# Patient Record
Sex: FEMALE | Race: WHITE
[De-identification: names, ages, dates, MRNs, and addresses within clinical notes are randomized per-mention and may not be internally consistent; named-entity substitution may affect disease eponyms.]

---

## 2017-04-07 ENCOUNTER — HOSPITAL ENCOUNTER (EMERGENCY)
Dept: HOSPITAL 62 - ER | Age: 42
Discharge: HOME | End: 2017-04-07
Payer: COMMERCIAL

## 2017-04-07 VITALS — SYSTOLIC BLOOD PRESSURE: 149 MMHG | DIASTOLIC BLOOD PRESSURE: 82 MMHG

## 2017-04-07 DIAGNOSIS — R10.9: Primary | ICD-10-CM

## 2017-04-07 DIAGNOSIS — K21.9: ICD-10-CM

## 2017-04-07 DIAGNOSIS — I10: ICD-10-CM

## 2017-04-07 DIAGNOSIS — E11.9: ICD-10-CM

## 2017-04-07 DIAGNOSIS — E66.01: ICD-10-CM

## 2017-04-07 LAB
ALBUMIN SERPL-MCNC: 4.2 G/DL (ref 3.5–5)
ALP SERPL-CCNC: 123 U/L (ref 38–126)
ALT SERPL-CCNC: 94 U/L (ref 9–52)
ANION GAP SERPL CALC-SCNC: 12 MMOL/L (ref 5–19)
APPEARANCE UR: (no result)
AST SERPL-CCNC: 81 U/L (ref 14–36)
BASOPHILS # BLD AUTO: 0.1 10^3/UL (ref 0–0.2)
BASOPHILS NFR BLD AUTO: 1.2 % (ref 0–2)
BILIRUB DIRECT SERPL-MCNC: 0.3 MG/DL (ref 0–0.4)
BILIRUB SERPL-MCNC: 0.8 MG/DL (ref 0.2–1.3)
BILIRUB UR QL STRIP: NEGATIVE
BUN SERPL-MCNC: 13 MG/DL (ref 7–20)
CALCIUM: 9.8 MG/DL (ref 8.4–10.2)
CHLORIDE SERPL-SCNC: 102 MMOL/L (ref 98–107)
CO2 SERPL-SCNC: 28 MMOL/L (ref 22–30)
CREAT SERPL-MCNC: 0.62 MG/DL (ref 0.52–1.25)
EOSINOPHIL # BLD AUTO: 0.1 10^3/UL (ref 0–0.6)
EOSINOPHIL NFR BLD AUTO: 0.7 % (ref 0–6)
ERYTHROCYTE [DISTWIDTH] IN BLOOD BY AUTOMATED COUNT: 13.8 % (ref 11.5–14)
GLUCOSE SERPL-MCNC: 194 MG/DL (ref 75–110)
GLUCOSE UR STRIP-MCNC: NEGATIVE MG/DL
HCT VFR BLD CALC: 42.9 % (ref 36–47)
HGB BLD-MCNC: 14.3 G/DL (ref 12–15.5)
HGB HCT DIFFERENCE: 0
KETONES UR STRIP-MCNC: NEGATIVE MG/DL
LIPASE SERPL-CCNC: 458.1 U/L (ref 23–300)
LYMPHOCYTES # BLD AUTO: 1.4 10^3/UL (ref 0.5–4.7)
LYMPHOCYTES NFR BLD AUTO: 13.2 % (ref 13–45)
MCH RBC QN AUTO: 28.8 PG (ref 27–33.4)
MCHC RBC AUTO-ENTMCNC: 33.3 G/DL (ref 32–36)
MCV RBC AUTO: 86 FL (ref 80–97)
MONOCYTES # BLD AUTO: 0.5 10^3/UL (ref 0.1–1.4)
MONOCYTES NFR BLD AUTO: 4.9 % (ref 3–13)
NEUTROPHILS # BLD AUTO: 8.5 10^3/UL (ref 1.7–8.2)
NEUTS SEG NFR BLD AUTO: 80 % (ref 42–78)
NITRITE UR QL STRIP: NEGATIVE
PH UR STRIP: 5 [PH] (ref 5–9)
POTASSIUM SERPL-SCNC: 4.2 MMOL/L (ref 3.6–5)
PROT SERPL-MCNC: 7.8 G/DL (ref 6.3–8.2)
PROT UR STRIP-MCNC: 30 MG/DL
RBC # BLD AUTO: 4.97 10^6/UL (ref 3.72–5.28)
SODIUM SERPL-SCNC: 142.3 MMOL/L (ref 137–145)
SP GR UR STRIP: 1.03
UROBILINOGEN UR-MCNC: NEGATIVE MG/DL (ref ?–2)
WBC # BLD AUTO: 10.7 10^3/UL (ref 4–10.5)

## 2017-04-07 PROCEDURE — 80053 COMPREHEN METABOLIC PANEL: CPT

## 2017-04-07 PROCEDURE — 96375 TX/PRO/DX INJ NEW DRUG ADDON: CPT

## 2017-04-07 PROCEDURE — 36415 COLL VENOUS BLD VENIPUNCTURE: CPT

## 2017-04-07 PROCEDURE — 85025 COMPLETE CBC W/AUTO DIFF WBC: CPT

## 2017-04-07 PROCEDURE — 99284 EMERGENCY DEPT VISIT MOD MDM: CPT

## 2017-04-07 PROCEDURE — 74176 CT ABD & PELVIS W/O CONTRAST: CPT

## 2017-04-07 PROCEDURE — 81001 URINALYSIS AUTO W/SCOPE: CPT

## 2017-04-07 PROCEDURE — 96374 THER/PROPH/DIAG INJ IV PUSH: CPT

## 2017-04-07 PROCEDURE — 83690 ASSAY OF LIPASE: CPT

## 2017-04-07 PROCEDURE — 76830 TRANSVAGINAL US NON-OB: CPT

## 2017-04-07 PROCEDURE — 93976 VASCULAR STUDY: CPT

## 2017-04-07 PROCEDURE — 84703 CHORIONIC GONADOTROPIN ASSAY: CPT

## 2017-04-07 NOTE — ER DOCUMENT REPORT
ED General





- General


Chief Complaint: Abdominal Pain


Stated Complaint: ABDOMINAL PAIN


Notes: 


Patient is a 41-year-old female with past medical history of morbid obesity and 

hypertension who presents with concerns of acute onset of right adnexal pain.  

States that she was treated for urinary tract infection several days ago and 

had resolution of her dysuria and suprapubic pain.  However, states today she 

had an acute onset of focal right adnexal pain that she describes as severe, 

stabbing and constant.  Nothing improves or worsens the pain.  She denies any 

history of similar symptoms in the past.  No prior history of nephrolithiasis, 

ovarian torsion.  Her last menstrual period was the beginning of this month.  

She notes associated nausea without vomiting.  No diarrhea, chest pain or 

shortness of breath.  She has no prior abdominal surgeries.  She denies any 

ongoing dysuria.  She has not seen her primary care doctor regarding today's 

concerns.


TRAVEL OUTSIDE OF THE [place] IN LAST 30 DAYS: No





- Related Data


Allergies/Adverse Reactions: 


 





No Known Allergies Allergy (Verified 04/07/17 16:24)


 











Past Medical History





- General


Information source: Patient





- Social History


Smoking Status: Never Smoker


Chew tobacco use (# tins/day): No


Frequency of alcohol use: None


Drug Abuse: None


Lives with: Spouse/Significant other


Family History: Hypertension


Patient has suicidal ideation: No


Patient has homicidal ideation: No





- Past Medical History


Cardiac Medical History: Reports: Hx Hypertension


Endocrine Medical History: Reports: Hx Diabetes Mellitus Type 2


Renal/ Medical History: Denies: Hx Ovarian Cysts, Hx Peritoneal Dialysis


GI Medical History: Reports: Hx Gastroesophageal Reflux Disease


Past Surgical History: Reports: Hx Tonsillectomy





- Immunizations


Immunizations up to date: Yes


Hx Diphtheria, Pertussis, Tetanus Vaccination: Yes


Hx Pneumococcal Vaccination: 01/01/10





Review of Systems





- Review of Systems


Notes: 


Constitutional: Negative for fever.


HENT: Negative for sore throat.


Eyes: Negative for visual changes.


Cardiovascular: Negative for chest pain.


Respiratory: Negative for shortness of breath.


Gastrointestinal: Positive for abdominal pain and nausea


Genitourinary: Negative for dysuria.


Musculoskeletal: Negative for back pain.


Skin: Negative for rash.


Neurological: Negative for headaches, weakness or numbness.





10 point ROS negative except as marked above and in HPI.





Physical Exam





- Vital signs


Vitals: 


 











Temp Pulse Resp BP Pulse Ox


 


 97.8 F   98   20   161/89 H  95 


 


 04/07/17 16:25  04/07/17 16:25  04/07/17 16:25  04/07/17 16:25  04/07/17 16:25











Interpretation: Hypertensive


Notes: 


PHYSICAL EXAMINATION:





GENERAL: Appears uncomfortable and in pain.  Morbidly obese.





HEAD: Atraumatic, normocephalic.





EYES: Pupils equal round and reactive to light, extraocular movements intact, 

sclera anicteric, conjunctiva are normal.





ENT: nares patent, oropharynx clear without exudates.  Moist mucous membranes.





NECK: Normal range of motion, supple without lymphadenopathy





LUNGS: Breath sounds clear to auscultation bilaterally and equal.  No wheezes 

rales or rhonchi.





HEART: Regular rate and rhythm without murmurs





ABDOMEN: Morbidly obese abdomen.  Soft, focal right adnexal tenderness, 

normoactive bowel sounds.  No guarding, no rebound.  No masses appreciated.





EXTREMITIES: Normal range of motion, no pitting or edema.  No cyanosis.





NEUROLOGICAL: No focal neurological deficits. Moves all extremities 

spontaneously and on command.





PSYCH: Normal mood, normal affect.





SKIN: Warm, Dry, normal turgor, no rashes or lesions noted.





Course





- Re-evaluation


Re-evalutation: 


04/07/17 19:01


Patient presents with acute onset of focal right lower adnexal tenderness on 

exam.  Her abdominal exam is limited secondary to her morbid obesity with a BMI 

of 60.  However, patient does have focal adnexal tenderness at time of 

palpation without any focal right lower quadrant tenderness.  She has no right 

CVA tenderness.  Urinalysis does show hematuria but is otherwise unremarkable.  

Primary concern at this time would be ovarian torsion.  I have asked for a stat 

transvaginal ultrasound to better assess.  Ultrasound was directly called and 

asked transfer this patient immediately for this ultrasound.  If this 

demonstrates good flow to the right ovary will plan for a CT without contrast 

of the abdomen and pelvis of nephrolithiasis given her hematuria and the abrupt 

onset and severity of her pain.








04/07/17 22:04


Patient has had resolution of her abdominal pain at this time.  Transvaginal 

ultrasound demonstrates flow to the right ovary and no obvious mass although it 

is a limited study secondary to patient's morbid obesity.  A CT scan of the 

abdomen and pelvis without contrast was performed to evaluate for acute 

nephrolithiasis and was normal.  Repeat abdominal exam now at this time without 

any focal tenderness.  Again her history is not clinically consistent with 

acute appendicitis given the abrupt onset of her pain.  Urinalysis is not 

consistent with acute pyelonephritis or an acute infection.  I have instructed 

the patient to follow-up with her primary care doctor in the morning for 

recheck of her abdomen.  I have reviewed with her that at this time not certain 

of the exact cause for her abdominal pain and that she should return to the 

emergency department immediately should she have recurrence of this pain, 

vomiting, fever or any other symptoms that are worrisome to her.





- Vital Signs


Vital signs: 


 











Temp Pulse Resp BP Pulse Ox


 


 97.8 F   98   20   161/89 H  95 


 


 04/07/17 16:25  04/07/17 16:25  04/07/17 16:25  04/07/17 16:25  04/07/17 16:25














- Laboratory


Result Diagrams: 


 04/07/17 17:30





 04/07/17 17:30


Laboratory results interpreted by me: 


 











  04/07/17 04/07/17 04/07/17





  17:30 17:30 17:30


 


WBC  10.7 H  


 


Seg Neutrophils %  80.0 H  


 


Absolute Neutrophils  8.5 H  


 


Glucose   194 H 


 


AST   81 H 


 


ALT   94 H 


 


Lipase   458.1 H 


 


Urine Protein    30 H


 


Urine Blood    LARGE H


 


Ur Leukocyte Esterase    TRACE H














Discharge





- Discharge


Clinical Impression: 


 Right sided abdominal pain





Condition: Good


Disposition: HOME, SELF-CARE


Instructions:  Observation for Appendicitis ([facility])


Additional Instructions: 


You have been seen in the Emergency Department (ED) for abdominal pain.  Your 

evaluation did not identify a clear cause of your symptoms but was generally 

reassuring.





Please follow up with your doctor as soon as possible regarding today's 

emergent visit and the symptoms that are bothering you.





Return to the ED if your abdominal pain worsens or fails to improve, you 

develop bloody vomiting, bloody diarrhea, you are unable to tolerate fluids due 

to vomiting, fever greater than 101, or other symptoms that concern you.


Referrals: 


VIBHA HALEY MD [Primary Care Provider] - Follow up tomorrow

## 2017-04-07 NOTE — ER DOCUMENT REPORT
ED Medical Screen (RME)





- General


Chief Complaint: Abdominal Pain


Stated Complaint: ABDOMINAL PAIN


Notes: 


Patient says that she's been having problems with a UTI for a couple of weeks.  

She had burning and discomfort and urgency urination and went to a local urgent 

care on March 26.  She was diagnosed with UTI and given a prescription for an 

antibiotic to take twice a day for 3 days.  She finished the antibiotic and her 

symptoms improved, but today, she went to the restroom.  She noted some burning 

with urination on Wednesday and then today she was walking along, when she was 

suddenly hit with a pain in the right lower pelvic region which "dropped me to 

my knees".  She is still complaining of severe pain in that right lower pelvic 

region.  Patient is not aware of any fever.  Has not had any vomiting or 

diarrhea.





LMP March 18.  On no birth control.


PMH: Hypertension, NIDDM, no surgeries.


TRAVEL OUTSIDE OF THE [place] IN LAST 30 DAYS: No





- Related Data


Allergies/Adverse Reactions: 


 





No Known Allergies Allergy (Verified 04/07/17 16:24)


 











Past Medical History





- Past Medical History


Cardiac Medical History: Reports: Hx Hypertension


Endocrine Medical History: Denies: Hx Diabetes Mellitus Type 2


Renal/ Medical History: Denies: Hx Ovarian Cysts, Hx Peritoneal Dialysis


GI Medical History: Reports: Hx Gastroesophageal Reflux Disease


Past Surgical History: Reports: Hx Tonsillectomy





- Immunizations


Immunizations up to date: Yes


Hx Diphtheria, Pertussis, Tetanus Vaccination: Yes





Physical Exam





- Vital signs


Vitals: 





 











Temp Pulse Resp BP Pulse Ox


 


 97.8 F   98   20   161/89 H  95 


 


 04/07/17 16:25  04/07/17 16:25  04/07/17 16:25  04/07/17 16:25  04/07/17 16:25














Course





- Vital Signs


Vital signs: 





 











Temp Pulse Resp BP Pulse Ox


 


 97.8 F   98   20   161/89 H  95 


 


 04/07/17 16:25  04/07/17 16:25  04/07/17 16:25  04/07/17 16:25  04/07/17 16:25

## 2017-04-27 ENCOUNTER — HOSPITAL ENCOUNTER (OUTPATIENT)
Dept: HOSPITAL 62 - OD | Age: 42
End: 2017-04-27
Attending: INTERNAL MEDICINE
Payer: COMMERCIAL

## 2017-04-27 DIAGNOSIS — E78.00: ICD-10-CM

## 2017-04-27 DIAGNOSIS — E11.9: Primary | ICD-10-CM

## 2017-04-27 LAB
ALBUMIN SERPL-MCNC: 3.9 G/DL (ref 3.5–5)
ALP SERPL-CCNC: 101 U/L (ref 38–126)
ALT SERPL-CCNC: 53 U/L (ref 9–52)
ANION GAP SERPL CALC-SCNC: 12 MMOL/L (ref 5–19)
AST SERPL-CCNC: 47 U/L (ref 14–36)
BILIRUB DIRECT SERPL-MCNC: 0.4 MG/DL (ref 0–0.4)
BILIRUB SERPL-MCNC: 1.2 MG/DL (ref 0.2–1.3)
BUN SERPL-MCNC: 12 MG/DL (ref 7–20)
CALCIUM: 9.5 MG/DL (ref 8.4–10.2)
CHLORIDE SERPL-SCNC: 101 MMOL/L (ref 98–107)
CO2 SERPL-SCNC: 30 MMOL/L (ref 22–30)
CREAT SERPL-MCNC: 0.63 MG/DL (ref 0.52–1.25)
DIRECT HDL: 62 MG/DL (ref 40–?)
GLUCOSE SERPL-MCNC: 113 MG/DL (ref 75–110)
LDLC SERPL DIRECT ASSAY-MCNC: 93 MG/DL (ref ?–100)
POTASSIUM SERPL-SCNC: 4.1 MMOL/L (ref 3.6–5)
PROT SERPL-MCNC: 7.4 G/DL (ref 6.3–8.2)
SODIUM SERPL-SCNC: 142.8 MMOL/L (ref 137–145)
TRIGL SERPL-MCNC: 83 MG/DL (ref ?–150)
VLDLC SERPL CALC-MCNC: 17 MG/DL (ref 10–31)

## 2017-04-27 PROCEDURE — 80053 COMPREHEN METABOLIC PANEL: CPT

## 2017-04-27 PROCEDURE — 36415 COLL VENOUS BLD VENIPUNCTURE: CPT

## 2017-04-27 PROCEDURE — 80061 LIPID PANEL: CPT

## 2017-09-20 ENCOUNTER — HOSPITAL ENCOUNTER (EMERGENCY)
Dept: HOSPITAL 62 - ER | Age: 42
Discharge: HOME | End: 2017-09-20
Payer: COMMERCIAL

## 2017-09-20 VITALS — SYSTOLIC BLOOD PRESSURE: 142 MMHG | DIASTOLIC BLOOD PRESSURE: 76 MMHG

## 2017-09-20 DIAGNOSIS — M54.5: ICD-10-CM

## 2017-09-20 DIAGNOSIS — R19.7: ICD-10-CM

## 2017-09-20 DIAGNOSIS — E11.9: ICD-10-CM

## 2017-09-20 DIAGNOSIS — R11.2: Primary | ICD-10-CM

## 2017-09-20 DIAGNOSIS — M54.9: ICD-10-CM

## 2017-09-20 DIAGNOSIS — R50.9: ICD-10-CM

## 2017-09-20 DIAGNOSIS — I10: ICD-10-CM

## 2017-09-20 LAB
ALBUMIN SERPL-MCNC: 3.8 G/DL (ref 3.5–5)
ALP SERPL-CCNC: 84 U/L (ref 38–126)
ALT SERPL-CCNC: 32 U/L (ref 9–52)
ANION GAP SERPL CALC-SCNC: 12 MMOL/L (ref 5–19)
APPEARANCE UR: (no result)
AST SERPL-CCNC: 26 U/L (ref 14–36)
BASOPHILS # BLD AUTO: 0.1 10^3/UL (ref 0–0.2)
BASOPHILS NFR BLD AUTO: 0.8 % (ref 0–2)
BILIRUB DIRECT SERPL-MCNC: 0.3 MG/DL (ref 0–0.4)
BILIRUB SERPL-MCNC: 0.8 MG/DL (ref 0.2–1.3)
BILIRUB UR QL STRIP: NEGATIVE
BUN SERPL-MCNC: 13 MG/DL (ref 7–20)
CALCIUM: 9.5 MG/DL (ref 8.4–10.2)
CHLORIDE SERPL-SCNC: 102 MMOL/L (ref 98–107)
CO2 SERPL-SCNC: 27 MMOL/L (ref 22–30)
CREAT SERPL-MCNC: 0.61 MG/DL (ref 0.52–1.25)
EOSINOPHIL # BLD AUTO: 0.1 10^3/UL (ref 0–0.6)
EOSINOPHIL NFR BLD AUTO: 1.3 % (ref 0–6)
ERYTHROCYTE [DISTWIDTH] IN BLOOD BY AUTOMATED COUNT: 14.4 % (ref 11.5–14)
GLUCOSE SERPL-MCNC: 161 MG/DL (ref 75–110)
GLUCOSE UR STRIP-MCNC: 50 MG/DL
HCT VFR BLD CALC: 37.9 % (ref 36–47)
HGB BLD-MCNC: 13.1 G/DL (ref 12–15.5)
HGB HCT DIFFERENCE: 1.4
KETONES UR STRIP-MCNC: NEGATIVE MG/DL
LYMPHOCYTES # BLD AUTO: 1.3 10^3/UL (ref 0.5–4.7)
LYMPHOCYTES NFR BLD AUTO: 16.7 % (ref 13–45)
MCH RBC QN AUTO: 29.9 PG (ref 27–33.4)
MCHC RBC AUTO-ENTMCNC: 34.4 G/DL (ref 32–36)
MCV RBC AUTO: 87 FL (ref 80–97)
MONOCYTES # BLD AUTO: 0.4 10^3/UL (ref 0.1–1.4)
MONOCYTES NFR BLD AUTO: 5.7 % (ref 3–13)
NEUTROPHILS # BLD AUTO: 5.9 10^3/UL (ref 1.7–8.2)
NEUTS SEG NFR BLD AUTO: 75.5 % (ref 42–78)
NITRITE UR QL STRIP: NEGATIVE
PH UR STRIP: 6 [PH] (ref 5–9)
POTASSIUM SERPL-SCNC: 4 MMOL/L (ref 3.6–5)
PROT SERPL-MCNC: 6.8 G/DL (ref 6.3–8.2)
PROT UR STRIP-MCNC: 100 MG/DL
RBC # BLD AUTO: 4.36 10^6/UL (ref 3.72–5.28)
SODIUM SERPL-SCNC: 140.8 MMOL/L (ref 137–145)
SP GR UR STRIP: 1
UROBILINOGEN UR-MCNC: NEGATIVE MG/DL (ref ?–2)
WBC # BLD AUTO: 7.8 10^3/UL (ref 4–10.5)

## 2017-09-20 PROCEDURE — 96374 THER/PROPH/DIAG INJ IV PUSH: CPT

## 2017-09-20 PROCEDURE — 84703 CHORIONIC GONADOTROPIN ASSAY: CPT

## 2017-09-20 PROCEDURE — 96361 HYDRATE IV INFUSION ADD-ON: CPT

## 2017-09-20 PROCEDURE — 81001 URINALYSIS AUTO W/SCOPE: CPT

## 2017-09-20 PROCEDURE — 99284 EMERGENCY DEPT VISIT MOD MDM: CPT

## 2017-09-20 PROCEDURE — 36415 COLL VENOUS BLD VENIPUNCTURE: CPT

## 2017-09-20 PROCEDURE — 96375 TX/PRO/DX INJ NEW DRUG ADDON: CPT

## 2017-09-20 PROCEDURE — 80053 COMPREHEN METABOLIC PANEL: CPT

## 2017-09-20 PROCEDURE — 76380 CAT SCAN FOLLOW-UP STUDY: CPT

## 2017-09-20 PROCEDURE — 85025 COMPLETE CBC W/AUTO DIFF WBC: CPT

## 2017-09-20 PROCEDURE — 87086 URINE CULTURE/COLONY COUNT: CPT

## 2017-09-20 NOTE — ER DOCUMENT REPORT
ED GI/





- General


Information source: Patient


TRAVEL OUTSIDE OF THE [place] IN LAST 30 DAYS: No





- HPI


Patient complains to provider of: No: Abdominal pain


Associated symptoms: Other - see above





<CINYD SHANKS - Last Filed: 09/20/17 11:17>





<FERN RECIO - Last Filed: 09/20/17 15:05>





- General


Chief Complaint: Nausea/Vomiting/Diarrhea


Stated Complaint: LOWER BACK PAIN


Time Seen by Provider: 09/20/17 06:46


Notes: 


Patient is a 42 year old female with a history of diabetes and hypertension who 

presents to the ED with complaints of nausea, vomiting diarrhea and right side 

back pain.  Patient states the back pain started 2 days ago when she woke up.  

She denies dysuria or abdominal pain.  She had a fever on Saturday but has had 

one since.  Patient states she has not been able to keep her medication down 

this morning.  She last checked her blood sugar at 0145 and and it was 155.  

She is currently on her menstrual period 


 (CINDY SHANKS)





- Related Data


Allergies/Adverse Reactions: 


 





No Known Allergies Allergy (Verified 09/20/17 05:08)


 











Past Medical History





- General


Information source: Patient





- Social History


Smoking Status: Unknown if Ever Smoked


Family History: Hypertension


Patient has suicidal ideation: No


Patient has homicidal ideation: No





- Past Medical History


Cardiac Medical History: Reports: Hx Hypertension


Endocrine Medical History: Reports: Hx Diabetes Mellitus Type 2


GI Medical History: Reports: Hx Gastroesophageal Reflux Disease


Past Surgical History: Reports: Hx Tonsillectomy





- Immunizations


Immunizations up to date: Yes


Hx Diphtheria, Pertussis, Tetanus Vaccination: Yes


Hx Pneumococcal Vaccination: 01/01/10





<CINDY SHANKS - Last Filed: 09/20/17 11:17>





Review of Systems





- Review of Systems


Constitutional: See HPI.  denies: Fever


EENT: No symptoms reported


Cardiovascular: No symptoms reported


Respiratory: No symptoms reported


Gastrointestinal: See HPI, Diarrhea, Nausea, Vomiting.  denies: Abdominal pain


Genitourinary: See HPI.  denies: Dysuria


Female Genitourinary: No symptoms reported, Last menstrual period - currently


Musculoskeletal: See HPI, Joint pain


Skin: No symptoms reported


Hematologic/Lymphatic: No symptoms reported


Neurological/Psychological: No symptoms reported





<CINDY SHANKS - Last Filed: 09/20/17 11:17>





Physical Exam





- General


General appearance: Alert, Other - apperas uncomfortable





- HEENT


Head: Normocephalic, Atraumatic


Eyes: Normal


Extraocular movements intact: Yes


Pupils: PERRL


Mucous membranes: Dry





- Respiratory


Respiratory status: No respiratory distress


Breath sounds: Normal





- Cardiovascular


Rhythm: Regular


Heart sounds: Normal auscultation


Murmur: No





- Abdominal


Inspection: Normal


Distension: No distension


Tenderness: Nontender





- Back


Back: CVA tenderness - right CVA tenderness





- Extremities


General upper extremity: Normal inspection, Normal ROM


General lower extremity: Normal inspection, Normal ROM





- Neurological


Neuro grossly intact: Yes





- Psychological


Associated symptoms: Normal affect, Normal mood





- Skin


Skin Temperature: Warm


Skin Moisture: Dry


Skin Color: Normal





<CINDY SHANKS - Last Filed: 09/20/17 11:17>





- Vital signs


Vitals: 


 











Temp Pulse Resp BP Pulse Ox


 


 98.4 F   80   20   141/75 H  98 


 


 09/20/17 05:07  09/20/17 05:07  09/20/17 05:07  09/20/17 05:07  09/20/17 05:07














Course





- Laboratory


Result Diagrams: 


 09/20/17 05:45





 09/20/17 05:45





<CINDY SHANKS - Last Filed: 09/20/17 11:17>





- Laboratory


Result Diagrams: 


 09/20/17 05:45





 09/20/17 05:45





- Diagnostic Test


Radiology reviewed: Reports reviewed





<FERN RECIO - Last Filed: 09/20/17 15:05>





- Re-evaluation


Re-evalutation: 





09/20/17 


Patient is a 42-year-old female who presents with nausea, vomiting, diarrhea 

and back pain.  Patient feels better after fluids and medication.  Able to 

tolerate p.o.  No acute findings on CT.  Patient does have her period which 

explains the blood in her urine.  Feels better.  Stable for discharge.  Follow-

up with PMD.  Return if any worsening or concerning symptoms.


 (FERN RECIO)





- Vital Signs


Vital signs: 


 











Temp Pulse Resp BP Pulse Ox


 


 98.0 F   78   18   142/76 H  99 


 


 09/20/17 09:17  09/20/17 09:17  09/20/17 09:17  09/20/17 09:17  09/20/17 09:17














- Laboratory


Laboratory results interpreted by me: 


 











  09/20/17 09/20/17 09/20/17





  05:15 05:45 05:45


 


RDW   14.4 H 


 


Glucose    161 H


 


Urine Protein  100 H  


 


Urine Glucose (UA)  50 H  


 


Urine Blood  LARGE H  














Discharge





<CINDY SHANKS - Last Filed: 09/20/17 11:17>





<FERN RECIO - Last Filed: 09/20/17 15:05>





- Discharge


Clinical Impression: 


Vomiting


Qualifiers:


 Vomiting type: unspecified Vomiting Intractability: unspecified Nausea presence

: with nausea Qualified Code(s): R11.2 - Nausea with vomiting, unspecified





Diarrhea


Qualifiers:


 Diarrhea type: unspecified type Qualified Code(s): R19.7 - Diarrhea, 

unspecified





Condition: Stable


Disposition: HOME, SELF-CARE


Instructions:  Diarrhea, Nonspecific ([facility]), Vomiting ([facility])


Prescriptions: 


Ondansetron [Zofran Odt 4 mg Tablet] 1 tab PO Q6HP PRN #15 tab.rapdis


 PRN Reason: For Nausea/Vomiting


Forms:  Return to Work


Referrals: 


VIBHA HALEY MD [Primary Care Provider] - Follow up as needed


Scribe Attestation: 





09/20/17 15:05


I personally performed the services described in the documentation, reviewed 

and edited the documentation which was dictated to the scribe in my presence, 

and it accurately records my words and actions. (FENR RECIO)





Scribe Documentation





- Scribe


Written by Dineshe:: aleks Urbina, 09/20/2017, 0735


acting as scribe for :: Kyle





<CINDY SHANKS - Last Filed: 09/20/17 11:17>

## 2017-09-20 NOTE — RADIOLOGY REPORT (SQ)
EXAM DESCRIPTION:  CT LTD RENAL STONE PROTOCOL ON



COMPLETED DATE/TIME:  9/20/2017 7:20 am



REASON FOR STUDY:  ev[place] for kidney stone



COMPARISON:  2/7/2016.



TECHNIQUE:  CT scan of the abdomen and pelvis performed without intravenous or oral contrast. Images 
reviewed with lung, soft tissue, and bone windows. Reconstructed coronal and sagittal MPR images revi
ewed. All images stored on PACS.

All CT scanners at this facility use dose modulation, iterative reconstruction, and/or weight based d
osing when appropriate to reduce radiation dose to as low as reasonably achievable (ALARA).

[facility]: Dose Right  [facility]: CareDose    [facility]: Dose Right    [facility]: Teradose 4D    [facility]: Smart Technologies



RADIATION DOSE:  Up-to-date CT equipment and radiation dose reduction techniques were employed. CTDIv
ol: 19.2 mGy. DLP: 1135 mGy-cm.mGy.



LIMITATIONS:  None.



FINDINGS:  LOWER CHEST: Small emphysematous cysts of the left lower lobe.  No nodules or infiltrates.


NON-CONTRASTED LIVER, SPLEEN, ADRENALS: Evaluation limited by lack of IV contrast. No identified sign
ificant masses.

PANCREAS: No masses. No peripancreatic inflammatory changes.

GALLBLADDER: No identified stones by CT criteria. No inflammatory changes to suggest cholecystitis.

RIGHT KIDNEY AND URETER: No suspicious masses. Assessment limited by lack of IV contrast.   No signif
icant calcifications.   No hydronephrosis or hydroureter.

LEFT KIDNEY AND URETER: No suspicious masses. Assessment limited by lack of IV contrast.   No signifi
cant calcifications.   No hydronephrosis or hydroureter.

AORTA AND RETROPERITONEUM: No aneurysm. No retroperitoneal masses or adenopathy.

BOWEL AND PERITONEAL CAVITY: No obvious masses or inflammatory changes. No free fluid.

APPENDIX: Normal.

PELVIS, BLADDER, AND ABDOMINAL WALL:7.1 cm umbilical fat only herniation, chronic.  No free fluid. Bl
adder normal.

BONES: Moderate L5-S1 disc desiccation.  Mild -moderate lower thoracic disc desiccation.

OTHER: No other significant finding.



IMPRESSION:  No acute findings.  Chronic 7 cm umbilical fat only herniation.



COMMENT:  Quality ID # [id number]: Final reports with documentation of one or more dose reduction techniques
 (e.g., Automated exposure control, adjustment of the mA and/or kV according to patient size, use of 
iterative reconstruction technique)



TECHNICAL DOCUMENTATION:  JOB ID:  [id number]

 2011 [employer]- All Rights Reserved

## 2019-04-18 ENCOUNTER — HOSPITAL ENCOUNTER (OUTPATIENT)
Dept: HOSPITAL 62 - OD | Age: 44
End: 2019-04-18
Attending: INTERNAL MEDICINE
Payer: COMMERCIAL

## 2019-04-18 DIAGNOSIS — R63.5: ICD-10-CM

## 2019-04-18 DIAGNOSIS — I10: ICD-10-CM

## 2019-04-18 DIAGNOSIS — Z79.899: ICD-10-CM

## 2019-04-18 DIAGNOSIS — E11.9: Primary | ICD-10-CM

## 2019-04-18 LAB
ADD MANUAL DIFF: NO
ALBUMIN SERPL-MCNC: 3.6 G/DL (ref 3.5–5)
ALP SERPL-CCNC: 85 U/L (ref 38–126)
ALT SERPL-CCNC: 27 U/L (ref 9–52)
ANION GAP SERPL CALC-SCNC: 6 MMOL/L (ref 5–19)
AST SERPL-CCNC: 28 U/L (ref 14–36)
BASOPHILS # BLD AUTO: 0.1 10^3/UL (ref 0–0.2)
BASOPHILS NFR BLD AUTO: 0.9 % (ref 0–2)
BILIRUB DIRECT SERPL-MCNC: 0.2 MG/DL (ref 0–0.4)
BILIRUB SERPL-MCNC: 0.8 MG/DL (ref 0.2–1.3)
BUN SERPL-MCNC: 12 MG/DL (ref 7–20)
CALCIUM: 9.7 MG/DL (ref 8.4–10.2)
CHLORIDE SERPL-SCNC: 104 MMOL/L (ref 98–107)
CHOLEST SERPL-MCNC: 182.58 MG/DL (ref 0–200)
CO2 SERPL-SCNC: 29 MMOL/L (ref 22–30)
EOSINOPHIL # BLD AUTO: 0.1 10^3/UL (ref 0–0.6)
EOSINOPHIL NFR BLD AUTO: 1.1 % (ref 0–6)
ERYTHROCYTE [DISTWIDTH] IN BLOOD BY AUTOMATED COUNT: 14.3 % (ref 11.5–14)
FREE T4 (FREE THYROXINE): 1.06 NG/DL (ref 0.78–2.19)
GLUCOSE SERPL-MCNC: 137 MG/DL (ref 75–110)
HCT VFR BLD CALC: 38.4 % (ref 36–47)
HGB BLD-MCNC: 13 G/DL (ref 12–15.5)
LDLC SERPL DIRECT ASSAY-MCNC: 100 MG/DL (ref ?–100)
LYMPHOCYTES # BLD AUTO: 1.1 10^3/UL (ref 0.5–4.7)
LYMPHOCYTES NFR BLD AUTO: 16.9 % (ref 13–45)
MCH RBC QN AUTO: 29 PG (ref 27–33.4)
MCHC RBC AUTO-ENTMCNC: 33.9 G/DL (ref 32–36)
MCV RBC AUTO: 85 FL (ref 80–97)
MONOCYTES # BLD AUTO: 0.4 10^3/UL (ref 0.1–1.4)
MONOCYTES NFR BLD AUTO: 6.4 % (ref 3–13)
NEUTROPHILS # BLD AUTO: 5.1 10^3/UL (ref 1.7–8.2)
NEUTS SEG NFR BLD AUTO: 74.7 % (ref 42–78)
PLATELET # BLD: 226 10^3/UL (ref 150–450)
POTASSIUM SERPL-SCNC: 4 MMOL/L (ref 3.6–5)
PROT SERPL-MCNC: 7.1 G/DL (ref 6.3–8.2)
RBC # BLD AUTO: 4.5 10^6/UL (ref 3.72–5.28)
SODIUM SERPL-SCNC: 139.3 MMOL/L (ref 137–145)
TOTAL CELLS COUNTED % (AUTO): 100 %
TRIGL SERPL-MCNC: 71 MG/DL (ref ?–150)
TSH SERPL-ACNC: 2.81 UIU/ML (ref 0.47–4.68)
VLDLC SERPL CALC-MCNC: 14 MG/DL (ref 10–31)
WBC # BLD AUTO: 6.8 10^3/UL (ref 4–10.5)

## 2019-04-18 PROCEDURE — 85025 COMPLETE CBC W/AUTO DIFF WBC: CPT

## 2019-04-18 PROCEDURE — 36415 COLL VENOUS BLD VENIPUNCTURE: CPT

## 2019-04-18 PROCEDURE — 84443 ASSAY THYROID STIM HORMONE: CPT

## 2019-04-18 PROCEDURE — 80053 COMPREHEN METABOLIC PANEL: CPT

## 2019-04-18 PROCEDURE — 84439 ASSAY OF FREE THYROXINE: CPT

## 2019-04-18 PROCEDURE — 80061 LIPID PANEL: CPT

## 2019-07-04 ENCOUNTER — HOSPITAL ENCOUNTER (EMERGENCY)
Dept: HOSPITAL 62 - ER | Age: 44
Discharge: HOME | End: 2019-07-04
Payer: COMMERCIAL

## 2019-07-04 VITALS — SYSTOLIC BLOOD PRESSURE: 157 MMHG | DIASTOLIC BLOOD PRESSURE: 81 MMHG

## 2019-07-04 DIAGNOSIS — L03.319: ICD-10-CM

## 2019-07-04 DIAGNOSIS — L02.211: Primary | ICD-10-CM

## 2019-07-04 DIAGNOSIS — E11.9: ICD-10-CM

## 2019-07-04 DIAGNOSIS — I10: ICD-10-CM

## 2019-07-04 PROCEDURE — 99283 EMERGENCY DEPT VISIT LOW MDM: CPT

## 2019-07-04 NOTE — ER DOCUMENT REPORT
ED Medical Screen (RME)





- General


Chief Complaint: Abscess


Stated Complaint: POSSIBLE ABSCESS


Time Seen by Provider: 07/04/19 09:00


Mode of Arrival: Ambulatory


Information source: Patient


Notes: 





44-year-old female presents to ED for complaint of large abscess to the left 

lower abdomen states is been there several days and it needs to be I&D.  Patient

is alert oriented respirations regular and unlabored speaking in full sentences 

walks with a even steady gait.  She has no past medical history and states she 

has no allergies.  She denies drinking smoking or using any kind of recreational

drugs.











I have greeted and performed a rapid initial assessment of this patient.  A 

comprehensive ED assessment and evaluation of the patient, analysis of test 

results and completion of medical decision making process will be conducted by 

an additional ED providers.








Dictation of this chart was performed using voice recognition software; 

therefore, there may be some unintended grammatical errors.


TRAVEL OUTSIDE OF THE [place] IN LAST 30 DAYS: No





- Related Data


Allergies/Adverse Reactions: 


                                        





No Known Allergies Allergy (Verified 09/20/17 05:08)


   











Past Medical History





- Past Medical History


Cardiac Medical History: Reports: Hx Hypertension


Endocrine Medical History: Reports: Hx Diabetes Mellitus Type 2


Renal/ Medical History: Denies: Hx Ovarian Cysts, Hx Peritoneal Dialysis


GI Medical History: Reports: Hx Gastroesophageal Reflux Disease


Past Surgical History: Reports: Hx Tonsillectomy





- Immunizations


Immunizations up to date: Yes


Hx Diphtheria, Pertussis, Tetanus Vaccination: Yes





Physical Exam





- Vital signs


Vitals: 





                                        











Temp Pulse Resp BP Pulse Ox


 


 98.2 F   77   18   154/81 H  98 


 


 07/04/19 08:04  07/04/19 08:04  07/04/19 08:04  07/04/19 08:04  07/04/19 08:04














Course





- Vital Signs


Vital signs: 





                                        











Temp Pulse Resp BP Pulse Ox


 


 98.2 F   77   18   154/81 H  98 


 


 07/04/19 08:04  07/04/19 08:04  07/04/19 08:04  07/04/19 08:04  07/04/19 08:04

## 2019-07-04 NOTE — ER DOCUMENT REPORT
ED Skin Rash/Insect Bite/Abscs





- General


Chief Complaint: Abscess


Stated Complaint: POSSIBLE ABSCESS


Time Seen by Provider: 07/04/19 09:00


Mode of Arrival: Ambulatory


Notes: 





Pleasant 44-year-old female with non-insulin-dependent diabetes mellitus 

presents to the emergency department with chief complaint of an abscess on her 

left lower abdomen that she first noticed on Monday.  She said it is gotten 

worse over the last couple of days.  She denies fevers or chills, nausea or 

vomiting, shortness of breath or chest pain, complains of warmth at the site, 

denies any purulent discharge.  Does not have a history of abscesses.  No drug 

allergies.  Not an IV drug user.  No other complaints


TRAVEL OUTSIDE OF THE [place] IN LAST 30 DAYS: No





- Related Data


Allergies/Adverse Reactions: 


                                        





No Known Allergies Allergy (Verified 09/20/17 05:08)


   











Past Medical History





- General


Information source: Patient





- Social History


Smoking Status: Never Smoker


Chew tobacco use (# tins/day): No


Drug Abuse: None


Family History: Hypertension


Patient has suicidal ideation: No


Patient has homicidal ideation: No





- Past Medical History


Cardiac Medical History: Reports: Hx Hypertension


Endocrine Medical History: Reports: Hx Diabetes Mellitus Type 2


Renal/ Medical History: Denies: Hx Ovarian Cysts, Hx Peritoneal Dialysis


GI Medical History: Reports: Hx Gastroesophageal Reflux Disease


Past Surgical History: Reports: Hx Tonsillectomy





- Immunizations


Immunizations up to date: Yes


Hx Diphtheria, Pertussis, Tetanus Vaccination: Yes


Hx Pneumococcal Vaccination: 01/01/10





Review of Systems





- Review of Systems


Constitutional: See HPI


EENT: No symptoms reported


Cardiovascular: See HPI


Respiratory: See HPI


Gastrointestinal: No symptoms reported


Genitourinary: No symptoms reported


Female Genitourinary: No symptoms reported


Musculoskeletal: No symptoms reported


Skin: See HPI


Hematologic/Lymphatic: No symptoms reported


Neurological/Psychological: No symptoms reported





Physical Exam





- Vital signs


Vitals: 


                                        











Temp Pulse Resp BP Pulse Ox


 


 98.2 F   77   18   154/81 H  98 


 


 07/04/19 08:04  07/04/19 08:04  07/04/19 08:04  07/04/19 08:04  07/04/19 08:04














- Notes


Notes: 





PHYSICAL EXAMINATION:





Reviewed vital signs and charting by RN





GENERAL: Alert, interacts well. No acute distress.


HEAD: Normocephalic, atraumatic.


EYES: Pupils equal and round. Extraocular movements intact.


ENT: Oral mucosa moist, tongue midline. 


NECK: Full range of motion. Trachea midline.


ABDOMEN: soft, non-tender.  No distention. Bowel sounds present


EXTREMITIES: Moves all 4 extremities spontaneously. No edema,  No cyanosis.


PSYCH: Normal affect, normal mood.


SKIN: Warm, dry, normal turgor.  Large abscess left lower quadrant of abdomen on

patient's pannus with an area of fluctuance and surrounding erythema consistent 

with cellulitis.








Course





- Re-evaluation


Re-evalutation: 





07/04/19 10:25


Overall well-appearing and afebrile.  Patient does have underlying cellulitis 

with an abscess that is indurated.  When I assessed purulent and sanguinous 

exudate came from the wound spontaneously.  Plan is to still perform an incision

and drainage.  Patient will receive Keflex and Bactrim here in the emergency 

department.


07/04/19 20:05


Incision and drainage performed unable to express any more purulent discharge.  

Patient tolerated procedure well.





- Vital Signs


Vital signs: 


                                        











Temp Pulse Resp BP Pulse Ox


 


 97.4 F   64   16   157/81 H  99 


 


 07/04/19 11:58  07/04/19 11:58  07/04/19 11:58  07/04/19 11:58  07/04/19 11:58














Discharge





- Discharge


Clinical Impression: 


 Abscess





Cellulitis


Qualifiers:


 Site of cellulitis: trunk Site of cellulitis of trunk: unspecified site 

Qualified Code(s): L03.319 - Cellulitis of trunk, unspecified





Condition: Good


Disposition: HOME, SELF-CARE


Instructions:  Abscess ([facility]), Cephalexin ([facility]), Oral Narcotic Medication ([facility]), 

Post Incision and Drainage, Trimethoprim-Sulfa ([facility])


Additional Instructions: 


You were seen for an abscess that required drainage. Please clean this area with

soap and water twice daily and apply a topical antibiotic. Dress the area after 

each cleaning.  The rash is likely due to infection of your skin called 

cellulitis.  You need to take the antibiotics as prescribed.  Do not stop even 

if the rash goes away until you have completed all the antibiotics.  The area of

redness was traced out here in the emergency department with a marking pen.  You

need to return to emergency department if the redness spreads outside of this 

area by more than 2 cm in any direction.  You should also return if you develop 

fevers with temperature greater than 101, persistent vomiting, worsening pain, 

or have any other symptoms that are concerning to you, the pain at the site wo

rsens, or you have any other symptoms that are concerning to you.


Prescriptions: 


Cephalexin Monohydrate [Keflex 500 mg Capsule] 500 mg PO QID #28 capsule


Sulfamethoxazole/Trimethoprim [Bactrim Ds Tablet] 1 each PO BID #14 tablet

## 2019-07-09 ENCOUNTER — HOSPITAL ENCOUNTER (EMERGENCY)
Dept: HOSPITAL 62 - ER | Age: 44
Discharge: HOME | End: 2019-07-09
Payer: COMMERCIAL

## 2019-07-09 VITALS — SYSTOLIC BLOOD PRESSURE: 126 MMHG | DIASTOLIC BLOOD PRESSURE: 76 MMHG

## 2019-07-09 DIAGNOSIS — R10.32: ICD-10-CM

## 2019-07-09 DIAGNOSIS — R10.9: ICD-10-CM

## 2019-07-09 DIAGNOSIS — R50.9: ICD-10-CM

## 2019-07-09 DIAGNOSIS — Z98.890: ICD-10-CM

## 2019-07-09 DIAGNOSIS — L02.211: Primary | ICD-10-CM

## 2019-07-09 DIAGNOSIS — E11.9: ICD-10-CM

## 2019-07-09 DIAGNOSIS — R11.2: ICD-10-CM

## 2019-07-09 DIAGNOSIS — I10: ICD-10-CM

## 2019-07-09 LAB
ADD MANUAL DIFF: NO
ANION GAP SERPL CALC-SCNC: 7 MMOL/L (ref 5–19)
BASOPHILS # BLD AUTO: 0 10^3/UL (ref 0–0.2)
BASOPHILS NFR BLD AUTO: 0.7 % (ref 0–2)
BUN SERPL-MCNC: 13 MG/DL (ref 7–20)
CALCIUM: 9.3 MG/DL (ref 8.4–10.2)
CHLORIDE SERPL-SCNC: 103 MMOL/L (ref 98–107)
CO2 SERPL-SCNC: 27 MMOL/L (ref 22–30)
EOSINOPHIL # BLD AUTO: 0.1 10^3/UL (ref 0–0.6)
EOSINOPHIL NFR BLD AUTO: 1.3 % (ref 0–6)
ERYTHROCYTE [DISTWIDTH] IN BLOOD BY AUTOMATED COUNT: 14.7 % (ref 11.5–14)
GLUCOSE SERPL-MCNC: 168 MG/DL (ref 75–110)
HCT VFR BLD CALC: 37.3 % (ref 36–47)
HGB BLD-MCNC: 12.5 G/DL (ref 12–15.5)
LYMPHOCYTES # BLD AUTO: 1.3 10^3/UL (ref 0.5–4.7)
LYMPHOCYTES NFR BLD AUTO: 19.8 % (ref 13–45)
MCH RBC QN AUTO: 28.7 PG (ref 27–33.4)
MCHC RBC AUTO-ENTMCNC: 33.4 G/DL (ref 32–36)
MCV RBC AUTO: 86 FL (ref 80–97)
MONOCYTES # BLD AUTO: 0.4 10^3/UL (ref 0.1–1.4)
MONOCYTES NFR BLD AUTO: 6.3 % (ref 3–13)
NEUTROPHILS # BLD AUTO: 4.7 10^3/UL (ref 1.7–8.2)
NEUTS SEG NFR BLD AUTO: 71.9 % (ref 42–78)
PLATELET # BLD: 261 10^3/UL (ref 150–450)
POTASSIUM SERPL-SCNC: 4.2 MMOL/L (ref 3.6–5)
RBC # BLD AUTO: 4.33 10^6/UL (ref 3.72–5.28)
SODIUM SERPL-SCNC: 137.4 MMOL/L (ref 137–145)
TOTAL CELLS COUNTED % (AUTO): 100 %
WBC # BLD AUTO: 6.5 10^3/UL (ref 4–10.5)

## 2019-07-09 PROCEDURE — 96375 TX/PRO/DX INJ NEW DRUG ADDON: CPT

## 2019-07-09 PROCEDURE — 96374 THER/PROPH/DIAG INJ IV PUSH: CPT

## 2019-07-09 PROCEDURE — 85025 COMPLETE CBC W/AUTO DIFF WBC: CPT

## 2019-07-09 PROCEDURE — 84703 CHORIONIC GONADOTROPIN ASSAY: CPT

## 2019-07-09 PROCEDURE — 36415 COLL VENOUS BLD VENIPUNCTURE: CPT

## 2019-07-09 PROCEDURE — 99283 EMERGENCY DEPT VISIT LOW MDM: CPT

## 2019-07-09 PROCEDURE — 80048 BASIC METABOLIC PNL TOTAL CA: CPT

## 2019-07-09 NOTE — ER DOCUMENT REPORT
ED Medical Screen (RME)





- General


Chief Complaint: Nausea/Vomiting


Stated Complaint: THROWING UP


Time Seen by Provider: 07/09/19 05:34


Notes: 





44-year-old female with a history of diabetes, chief complaint of pain to the 

left side of her lower abdomen, vomiting x3 today, and a fever yesterday.  She 

states 4 days ago she had the area drained here, she is taking Bactrim and 

Keflex, she states she felt better for a couple of days and then started 

worsening yesterday.


TRAVEL OUTSIDE OF THE [place] IN LAST 30 DAYS: No





- Related Data


Allergies/Adverse Reactions: 


                                        





No Known Allergies Allergy (Verified 07/09/19 05:14)


   











Past Medical History





- Past Medical History


Cardiac Medical History: Reports: Hx Hypertension


Endocrine Medical History: Reports: Hx Diabetes Mellitus Type 2


Renal/ Medical History: Denies: Hx Ovarian Cysts, Hx Peritoneal Dialysis


GI Medical History: Reports: Hx Gastroesophageal Reflux Disease


Past Surgical History: Reports: Hx Tonsillectomy





- Immunizations


Immunizations up to date: Yes


Hx Diphtheria, Pertussis, Tetanus Vaccination: Yes





Physical Exam





- Vital signs


Vitals: 





                                        











Temp Pulse Resp BP Pulse Ox


 


 97.9 F   83   22 H  167/75 H  97 


 


 07/09/19 05:12  07/09/19 05:12  07/09/19 05:12  07/09/19 05:12  07/09/19 05:12














- Abdominal


Tenderness: Tender - There is tenderness with erythema, induration, possible 

fluctuance over the left lower abdomen at the top of the pannus.





Course





- Re-evaluation


Re-evalutation: 


I have greeted and performed a rapid initial assessment of this patient.  A 

comprehensive ED assessment and evaluation of the patient, analysis of test 

results and completion of the medical decision making process will be conducted 

by additional ED providers.





- Vital Signs


Vital signs: 





                                        











Temp Pulse Resp BP Pulse Ox


 


 97.9 F   83   22 H  167/75 H  97 


 


 07/09/19 05:12  07/09/19 05:12  07/09/19 05:12  07/09/19 05:12  07/09/19 05:12

## 2019-07-09 NOTE — ER DOCUMENT REPORT
ED General





- General


Chief Complaint: Nausea/Vomiting


Stated Complaint: THROWING UP


Time Seen by Provider: 07/09/19 05:34


TRAVEL OUTSIDE OF THE [place] IN LAST 30 DAYS: No





- HPI


Notes: 





Patient is a 44-year-old female that presents to the emergency department for 

chief complaint of nausea vomiting and abdominal pain.


Patient reports left lower quadrant abscess that was incised and drained in the 

emergency room a few days ago has had increased drainage over the last 2 days.  

She states it is yellow appearing.  She states the discharge is minimal in 

quantity.  She does have pain over the area of the abscess in her left lower 

abdomen.  She denies any other abdominal pain aside from the abscess.  Patient 

states yesterday she had one episode of emesis and this morning she had one 

episode of emesis.  She reports a fever yesterday of 101.0.  Patient did take 

Tylenol for her fever which improved her symptoms.  She denies any diarrhea.  

She denies any black or bloody stools or hematemesis.  She denies any urinary 

frequency or dysuria.





Past Medical History: Diabetes





Past Surgical History: Reviewed in chart





Social History: Denies drugs alcohol and tobacco





Family History: Reviewed and noncontributory for presenting illness





Allergies: Reviewed, see documented allergy list.








REVIEW OF SYSTEMS:





CONSTITUTIONAL : 





 fever





No chills





No diaphoresis





No recent illness





EENT:





No vision changes





No congestion





No sore throat  





CARDIOVASCULAR:





No chest pain





No palpitations





RESPIRATORY:





No shortness of breath





No cough





No difficulty breathing





GASTROINTESTINAL: 





 abdominal pain





 nausea





 vomiting





No diarrhea





GENITOURINARY:





No dysuria





No hematuria





No difficulty urinating





MUSCULOSKELETAL:





No back pain





No leg pain





No arm pain





SKIN:  





No rashes





Abscess





LYMPHATIC: 





No swollen, enlarged glands.





NEUROLOGICAL: 





No lightheadedness





No headache





No weakness





No paresthesias





PSYCHIATRIC:





No anxiety





No depression 








PHYSICAL EXAMINATION:





Vital signs reviewed, nursing noted reviewed.





GENERAL: Well-appearing, obese and in no acute distress.





HEAD: Atraumatic, normocephalic.





EYES: Eyes appear normal, extraocular movements intact, sclera anicteric, 

conjunctiva are normal.





ENT: nares patent, oropharynx clear without exudates.  Moist mucous membranes.





NECK: Normal range of motion, supple without lymphadenopathy





LUNGS: Breath sounds clear to auscultation bilaterally and equal.  No wheezes 

rales or rhonchi.





HEART: Regular rate and rhythm without murmurs





ABDOMEN: Soft, left lower abdominal tenderness localized to the abscess, no 

other apparent abdominal tenderness.  No rebound, guarding, or rigidity. No 

masses appreciated.





EXTREMITIES: Nontender, good range of motion, trace pretibial edema bilaterally





NEUROLOGICAL: No focal neurological deficits. Moves all extremities 

spontaneously Motor and sensory grossly intact on exam.





PSYCH: Tearful, normal affect.





SKIN: Warm, Dry, normal turgor, 2.0 cm x 2.0 cm area of induration and erythema 

on the left lower abdomen with overlying open wound with good granulation 

tissue.  No active drainage.  No areas of fluctuance.











- Related Data


Allergies/Adverse Reactions: 


                                        





No Known Allergies Allergy (Verified 07/09/19 05:14)


   











Past Medical History





- Social History


Smoking Status: Unknown if Ever Smoked


Family History: Hypertension


Patient has suicidal ideation: No


Patient has homicidal ideation: No





- Past Medical History


Cardiac Medical History: Reports: Hx Hypertension


Endocrine Medical History: Reports: Hx Diabetes Mellitus Type 2


Renal/ Medical History: Denies: Hx Ovarian Cysts, Hx Peritoneal Dialysis


GI Medical History: Reports: Hx Gastroesophageal Reflux Disease


Past Surgical History: Reports: Hx Tonsillectomy





- Immunizations


Immunizations up to date: Yes


Hx Diphtheria, Pertussis, Tetanus Vaccination: Yes


Hx Pneumococcal Vaccination: 01/01/10





Physical Exam





- Vital signs


Vitals: 





                                        











Temp Pulse Resp BP Pulse Ox


 


 97.9 F   83   22 H  167/75 H  97 


 


 07/09/19 05:12  07/09/19 05:12  07/09/19 05:12  07/09/19 05:12  07/09/19 05:12














Course





- Re-evaluation


Re-evalutation: 





07/09/19 06:57


Vitals reviewed.  Nursing notes reviewed.  Patient does have an area in her left

lower quadrant consistent with cellulitis that has been previously incised and 

drained.  I do not appreciate any areas of fluctuance.  The wound is still open 

partially from her previous incision and drainage.  There is good granulation 

tissue forming and she appears to be healing.  She does have cellulitis present 

still and has 4 days left on her antibiotics.  I encouraged her to continue 

taking her antibiotics as previously prescribed.  At this point I feel incision 

and drainage is not indicated because of the lack of fluctuance.  Patient did 

have lab work drawn today which shows no significant leukocytosis.  She has no 

electrolyte derangement or renal insufficiency to suggest dehydration.  Patient 

is otherwise well-appearing.  She is tearful because she missed work and will be

given a note for work today.  Patient will follow with primary care as 

previously recommended.  She will return for new or worsening symptoms.  She is 

stable at discharge.





Laboratory











  07/09/19 07/09/19 07/09/19





  05:59 05:59 05:59


 


WBC  6.5  


 


RBC  4.33  


 


Hgb  12.5  


 


Hct  37.3  


 


MCV  86  


 


MCH  28.7  


 


MCHC  33.4  


 


RDW  14.7 H  


 


Plt Count  261  


 


Seg Neutrophils %  71.9  


 


Lymphocytes %  19.8  


 


Monocytes %  6.3  


 


Eosinophils %  1.3  


 


Basophils %  0.7  


 


Absolute Neutrophils  4.7  


 


Absolute Lymphocytes  1.3  


 


Absolute Monocytes  0.4  


 


Absolute Eosinophils  0.1  


 


Absolute Basophils  0.0  


 


Sodium   137.4 


 


Potassium   4.2 


 


Chloride   103 


 


Carbon Dioxide   27 


 


Anion Gap   7 


 


BUN   13 


 


Creatinine   0.62 


 


Est GFR ( Amer)   > 60 


 


Est GFR (Non-Af Amer)   > 60 


 


Glucose   168 H 


 


Calcium   9.3 


 


Serum HCG, Qual    NEGATIVE














- Vital Signs


Vital signs: 





                                        











Temp Pulse Resp BP Pulse Ox


 


 97.9 F   83   22 H  167/75 H  97 


 


 07/09/19 05:12  07/09/19 05:12  07/09/19 05:12  07/09/19 05:12  07/09/19 05:12














- Laboratory


Result Diagrams: 


                                 07/09/19 05:59





                                 07/09/19 05:59


Laboratory results interpreted by me: 





                                        











  07/09/19 07/09/19





  05:59 05:59


 


RDW  14.7 H 


 


Glucose   168 H














Discharge





- Discharge


Clinical Impression: 


 Abdominal abscess





Nausea and vomiting


Qualifiers:


 Vomiting type: unspecified Vomiting Intractability: non-intractable Qualified 

Code(s): R11.2 - Nausea with vomiting, unspecified





Condition: Stable


Disposition: HOME, SELF-CARE


Instructions:  Abscess ([facility]), MRSA Cellulitis ([facility])


Additional Instructions: 


Please return to the emergency department if you have any worsening, or concern 

of your symptoms.





Please return to the emergency department if you develop chest pain, difficulty 

breathing, severe abdominal pain, or ongoing vomiting.





Please follow-up with your primary care physician in 2-3 days and any other 

recommended physicians.





If prescribed, take all medications as directed. 





If you have any questions or concerns do not hesitate to return the emergency 

department for evaluation.





Continue taking your antibiotics as prescribed





Prescriptions: 


Ondansetron [Zofran Odt 4 mg Tablet] 1 tab PO Q4H PRN #15 tab.rapdis


 PRN Reason: For Nausea/Vomiting


Forms:  Return to Work


Referrals: 


[facility] [Provider Group] - Follow up as needed

## 2020-07-30 ENCOUNTER — HOSPITAL ENCOUNTER (OUTPATIENT)
Dept: HOSPITAL 62 - OD | Age: 45
End: 2020-07-30
Attending: INTERNAL MEDICINE
Payer: COMMERCIAL

## 2020-07-30 DIAGNOSIS — I10: ICD-10-CM

## 2020-07-30 DIAGNOSIS — E11.9: Primary | ICD-10-CM

## 2020-07-30 DIAGNOSIS — E66.9: ICD-10-CM

## 2020-07-30 DIAGNOSIS — Z79.899: ICD-10-CM

## 2020-07-30 LAB
ADD MANUAL DIFF: NO
ALBUMIN SERPL-MCNC: 4 G/DL (ref 3.5–5)
ALP SERPL-CCNC: 95 U/L (ref 38–126)
ANION GAP SERPL CALC-SCNC: 6 MMOL/L (ref 5–19)
AST SERPL-CCNC: 40 U/L (ref 14–36)
BASOPHILS # BLD AUTO: 0.1 10^3/UL (ref 0–0.2)
BASOPHILS NFR BLD AUTO: 0.9 % (ref 0–2)
BILIRUB DIRECT SERPL-MCNC: 0 MG/DL (ref 0–0.4)
BILIRUB SERPL-MCNC: 0.6 MG/DL (ref 0.2–1.3)
BUN SERPL-MCNC: 13 MG/DL (ref 7–20)
CALCIUM: 9.6 MG/DL (ref 8.4–10.2)
CHLORIDE SERPL-SCNC: 101 MMOL/L (ref 98–107)
CHOLEST SERPL-MCNC: 194 MG/DL (ref 0–200)
CO2 SERPL-SCNC: 30 MMOL/L (ref 22–30)
EOSINOPHIL # BLD AUTO: 0.1 10^3/UL (ref 0–0.6)
EOSINOPHIL NFR BLD AUTO: 1.4 % (ref 0–6)
ERYTHROCYTE [DISTWIDTH] IN BLOOD BY AUTOMATED COUNT: 14.5 % (ref 11.5–14)
GLUCOSE SERPL-MCNC: 171 MG/DL (ref 75–110)
HCT VFR BLD CALC: 40.7 % (ref 36–47)
HGB BLD-MCNC: 13.7 G/DL (ref 12–15.5)
LDLC SERPL DIRECT ASSAY-MCNC: 113 MG/DL (ref ?–100)
LYMPHOCYTES # BLD AUTO: 1.4 10^3/UL (ref 0.5–4.7)
LYMPHOCYTES NFR BLD AUTO: 19.8 % (ref 13–45)
MCH RBC QN AUTO: 29.4 PG (ref 27–33.4)
MCHC RBC AUTO-ENTMCNC: 33.7 G/DL (ref 32–36)
MCV RBC AUTO: 87 FL (ref 80–97)
MONOCYTES # BLD AUTO: 0.4 10^3/UL (ref 0.1–1.4)
MONOCYTES NFR BLD AUTO: 5.8 % (ref 3–13)
NEUTROPHILS # BLD AUTO: 5.1 10^3/UL (ref 1.7–8.2)
NEUTS SEG NFR BLD AUTO: 72.1 % (ref 42–78)
PLATELET # BLD: 226 10^3/UL (ref 150–450)
POTASSIUM SERPL-SCNC: 4 MMOL/L (ref 3.6–5)
PROT SERPL-MCNC: 7.4 G/DL (ref 6.3–8.2)
RBC # BLD AUTO: 4.66 10^6/UL (ref 3.72–5.28)
TOTAL CELLS COUNTED % (AUTO): 100 %
TRIGL SERPL-MCNC: 117 MG/DL (ref ?–150)
VLDLC SERPL CALC-MCNC: 23 MG/DL (ref 10–31)
WBC # BLD AUTO: 7.1 10^3/UL (ref 4–10.5)

## 2020-07-30 PROCEDURE — 85025 COMPLETE CBC W/AUTO DIFF WBC: CPT

## 2020-07-30 PROCEDURE — 80061 LIPID PANEL: CPT

## 2020-07-30 PROCEDURE — 80053 COMPREHEN METABOLIC PANEL: CPT

## 2020-07-30 PROCEDURE — 36415 COLL VENOUS BLD VENIPUNCTURE: CPT
